# Patient Record
(demographics unavailable — no encounter records)

---

## 2024-10-15 NOTE — PHYSICAL EXAM
[de-identified] : Patient is removed from his cast.  Patient has excellent range of motion.  Mild tenderness at the fracture site.  No rotational abnormality

## 2024-10-15 NOTE — DATA REVIEWED
[FreeTextEntry1] : Radiographs 3 views of the right hand are reviewed documenting good position alignment and healing of a right base of fifth metacarpal fracture

## 2024-10-15 NOTE — ASSESSMENT
[FreeTextEntry1] : Patient is a right base of fifth metacarpal fracture.  Patient is doing well.  No splint.  See us back on an as-needed basis.  The natural history associated with the fracture was discussed

## 2024-10-15 NOTE — HISTORY OF PRESENT ILLNESS
[de-identified] : 26-year-old male right base of fifth metacarpal fracture comes in today for evaluation.  No significant complaints with the cast on.  He has been working.